# Patient Record
(demographics unavailable — no encounter records)

---

## 2024-11-08 NOTE — PROCEDURE
[] : Otitis Externa [Same] : same as the Pre Op Dx. [FreeTextEntry5] : yellow drainage removed from L eac with suction, l tm intact

## 2024-11-08 NOTE — HISTORY OF PRESENT ILLNESS
[de-identified] : 26 y/o F p/w l otalgia for the past x6 days. She was initially seen in the ER x6 days ago for severe l otalgia, was rx'd ciprodex with no relief. She then returned to the ER the next day and was given augmentin and instructed to stop taking ciprodex. She then went to urgent care and ER the next day, CT head was performed which revealed that she had oe, no mastoiditis was seen. Ear wick was also placed (x3 days ago) She then restarted ciprodex x2 days ago and is still taking augmentin. Patient uses qtip. Denies fever, recent swimming, airplane travel, recent URI, h/o diabetes.

## 2024-11-08 NOTE — ASSESSMENT
[FreeTextEntry1] : 1. acute otitis externa, L -l ear wick removed with bayonet forceps, yellow drainage removed from L eac with suction, l tm intact -continue ciprodex for x1 week and augmentin as directed -RTC on tuesday -dep

## 2024-11-08 NOTE — HISTORY OF PRESENT ILLNESS
[de-identified] : 26 y/o F p/w l otalgia for the past x6 days. She was initially seen in the ER x6 days ago for severe l otalgia, was rx'd ciprodex with no relief. She then returned to the ER the next day and was given augmentin and instructed to stop taking ciprodex. She then went to urgent care and ER the next day, CT head was performed which revealed that she had oe, no mastoiditis was seen. Ear wick was also placed (x3 days ago) She then restarted ciprodex x2 days ago and is still taking augmentin. Patient uses qtip. Denies fever, recent swimming, airplane travel, recent URI, h/o diabetes.

## 2024-11-08 NOTE — PHYSICAL EXAM
[Normal] : assessment of respiratory effort is normal [de-identified] : L ear wick in place, removed with bayonet forceps, yellow drainage noted to L eac,

## 2024-11-08 NOTE — PHYSICAL EXAM
[Normal] : assessment of respiratory effort is normal [de-identified] : L ear wick in place, removed with bayonet forceps, yellow drainage noted to L eac,

## 2024-11-08 NOTE — END OF VISIT
[FreeTextEntry3] :  I, Dr. Turner, personally performed the evaluation and management (E/M) services for this new patient.  That E/M includes conducting the initial examination, assessing all conditions, and establishing the plan of care.  Today, my physician assistant, Roseline Mendez, was here to observe my evaluation and management services for this patient to be followed going forward.

## 2024-11-14 NOTE — PROCEDURE
[Cerumen Impaction] : Cerumen Impaction [Same] : same as the Pre Op Dx. [] : Removal of Cerumen [FreeTextEntry6] : cerumen atraumatically removed with suction, L; r nl

## 2024-11-14 NOTE — END OF VISIT
[FreeTextEntry3] : I, Dr. Turner, personally performed the evaluation and management (E/M) services for this established patient who presents today with (a) new problem(s)/exacerbation of (an) existing condition(s).  That E/M includes conducting the examination, assessing all new/exacerbated conditions, and establishing a new plan of care.  Today, my physician assistant, Roseline Mendez, was here to observe my evaluation and management services for this new problem/exacerbated condition to be followed going forward.

## 2024-11-14 NOTE — HISTORY OF PRESENT ILLNESS
[de-identified] : 1 week follow up visit for this 26 y/o F. She was recently dx'd otitis externa, wick was removed. She was tx'd with ciprodex and switched from augmentin to ciprofloxacin oral. She is accompanied by her partner. She states that symptoms have improved but she is still exhibiting mild L otalgia. Partner concerned that patient had an adverse reaction to ciprofloxacin - panic attacks.

## 2024-11-14 NOTE — PHYSICAL EXAM
[Normal] : assessment of respiratory effort is normal [de-identified] : cerumen atraumatically removed with suction, L; r nl

## 2024-11-14 NOTE — ASSESSMENT
[FreeTextEntry1] : 1. acute otitis externa -resolving -audio: hearing wnl AU; type A tymps AU -results reviewed with patient -advised patient to stop cipro due to adverse effects -added cipro as an allergy to patient's medical history -RTC if sxs persist after x1 week

## 2024-11-14 NOTE — HISTORY OF PRESENT ILLNESS
[de-identified] : 1 week follow up visit for this 26 y/o F. She was recently dx'd otitis externa, wick was removed. She was tx'd with ciprodex and switched from augmentin to ciprofloxacin oral. She is accompanied by her partner. She states that symptoms have improved but she is still exhibiting mild L otalgia. Partner concerned that patient had an adverse reaction to ciprofloxacin - panic attacks.

## 2024-11-14 NOTE — PHYSICAL EXAM
[Normal] : assessment of respiratory effort is normal [de-identified] : cerumen atraumatically removed with suction, L; r nl